# Patient Record
Sex: MALE | Race: WHITE | NOT HISPANIC OR LATINO | Employment: OTHER | URBAN - METROPOLITAN AREA
[De-identification: names, ages, dates, MRNs, and addresses within clinical notes are randomized per-mention and may not be internally consistent; named-entity substitution may affect disease eponyms.]

---

## 2021-12-26 ENCOUNTER — APPOINTMENT (OUTPATIENT)
Dept: GENERAL RADIOLOGY | Facility: HOSPITAL | Age: 61
End: 2021-12-26

## 2021-12-26 LAB
ALBUMIN SERPL-MCNC: 4.8 G/DL (ref 3.5–5.2)
ALBUMIN/GLOB SERPL: 1.7 G/DL
ALP SERPL-CCNC: 85 U/L (ref 39–117)
ALT SERPL W P-5'-P-CCNC: 23 U/L (ref 1–41)
ANION GAP SERPL CALCULATED.3IONS-SCNC: 12.2 MMOL/L (ref 5–15)
AST SERPL-CCNC: 22 U/L (ref 1–40)
BASOPHILS # BLD AUTO: 0.05 10*3/MM3 (ref 0–0.2)
BASOPHILS NFR BLD AUTO: 0.7 % (ref 0–1.5)
BILIRUB SERPL-MCNC: 0.6 MG/DL (ref 0–1.2)
BUN SERPL-MCNC: 15 MG/DL (ref 6–20)
BUN/CREAT SERPL: 11.6 (ref 7–25)
CALCIUM SPEC-SCNC: 9.8 MG/DL (ref 8.6–10.5)
CHLORIDE SERPL-SCNC: 103 MMOL/L (ref 98–107)
CO2 SERPL-SCNC: 29.8 MMOL/L (ref 22–29)
CREAT SERPL-MCNC: 1.29 MG/DL (ref 0.76–1.27)
DEPRECATED RDW RBC AUTO: 42.3 FL (ref 37–54)
EOSINOPHIL # BLD AUTO: 0.12 10*3/MM3 (ref 0–0.4)
EOSINOPHIL NFR BLD AUTO: 1.7 % (ref 0.3–6.2)
ERYTHROCYTE [DISTWIDTH] IN BLOOD BY AUTOMATED COUNT: 12.4 % (ref 12.3–15.4)
GFR SERPL CREATININE-BSD FRML MDRD: 57 ML/MIN/1.73
GLOBULIN UR ELPH-MCNC: 2.8 GM/DL
GLUCOSE SERPL-MCNC: 103 MG/DL (ref 65–99)
HCT VFR BLD AUTO: 43.6 % (ref 37.5–51)
HGB BLD-MCNC: 15.2 G/DL (ref 13–17.7)
HOLD SPECIMEN: NORMAL
HOLD SPECIMEN: NORMAL
IMM GRANULOCYTES # BLD AUTO: 0.03 10*3/MM3 (ref 0–0.05)
IMM GRANULOCYTES NFR BLD AUTO: 0.4 % (ref 0–0.5)
LYMPHOCYTES # BLD AUTO: 2.19 10*3/MM3 (ref 0.7–3.1)
LYMPHOCYTES NFR BLD AUTO: 30.9 % (ref 19.6–45.3)
MCH RBC QN AUTO: 32.8 PG (ref 26.6–33)
MCHC RBC AUTO-ENTMCNC: 34.9 G/DL (ref 31.5–35.7)
MCV RBC AUTO: 94 FL (ref 79–97)
MONOCYTES # BLD AUTO: 0.68 10*3/MM3 (ref 0.1–0.9)
MONOCYTES NFR BLD AUTO: 9.6 % (ref 5–12)
NEUTROPHILS NFR BLD AUTO: 4.01 10*3/MM3 (ref 1.7–7)
NEUTROPHILS NFR BLD AUTO: 56.7 % (ref 42.7–76)
NRBC BLD AUTO-RTO: 0 /100 WBC (ref 0–0.2)
PLATELET # BLD AUTO: 210 10*3/MM3 (ref 140–450)
PMV BLD AUTO: 10.8 FL (ref 6–12)
POTASSIUM SERPL-SCNC: 3.7 MMOL/L (ref 3.5–5.2)
PROT SERPL-MCNC: 7.6 G/DL (ref 6–8.5)
RBC # BLD AUTO: 4.64 10*6/MM3 (ref 4.14–5.8)
SODIUM SERPL-SCNC: 145 MMOL/L (ref 136–145)
TROPONIN T SERPL-MCNC: <0.01 NG/ML (ref 0–0.03)
WBC NRBC COR # BLD: 7.08 10*3/MM3 (ref 3.4–10.8)
WHOLE BLOOD HOLD SPECIMEN: NORMAL
WHOLE BLOOD HOLD SPECIMEN: NORMAL

## 2021-12-26 PROCEDURE — 93010 ELECTROCARDIOGRAM REPORT: CPT | Performed by: INTERNAL MEDICINE

## 2021-12-26 PROCEDURE — 71046 X-RAY EXAM CHEST 2 VIEWS: CPT

## 2021-12-26 PROCEDURE — 84484 ASSAY OF TROPONIN QUANT: CPT

## 2021-12-26 PROCEDURE — 99285 EMERGENCY DEPT VISIT HI MDM: CPT

## 2021-12-26 PROCEDURE — 93005 ELECTROCARDIOGRAM TRACING: CPT | Performed by: EMERGENCY MEDICINE

## 2021-12-26 PROCEDURE — 85025 COMPLETE CBC W/AUTO DIFF WBC: CPT

## 2021-12-26 PROCEDURE — 80053 COMPREHEN METABOLIC PANEL: CPT

## 2021-12-26 PROCEDURE — 93005 ELECTROCARDIOGRAM TRACING: CPT

## 2021-12-26 RX ORDER — ASPIRIN 325 MG
325 TABLET ORAL ONCE
Status: DISCONTINUED | OUTPATIENT
Start: 2021-12-26 | End: 2021-12-27 | Stop reason: HOSPADM

## 2021-12-26 RX ORDER — SODIUM CHLORIDE 0.9 % (FLUSH) 0.9 %
10 SYRINGE (ML) INJECTION AS NEEDED
Status: DISCONTINUED | OUTPATIENT
Start: 2021-12-26 | End: 2021-12-27 | Stop reason: HOSPADM

## 2021-12-26 NOTE — ED NOTES
Pt c/o cp and chest fluttering. Pt states that he is having swelling in his ankles and is feeling weak. Pt taking norco 5mg for back pain which he took about 2 hours ago.     Patient was placed in face mask during first look triage.  Patient was wearing a face mask throughout encounter.  I wore all personal protective equipment including N95 mask, goggles, and gloves throughout the encounter.  Hand hygiene was performed before and after patient encounter.        Mary Foley RN  12/26/21 5134     [] : Resident

## 2021-12-27 ENCOUNTER — HOSPITAL ENCOUNTER (OUTPATIENT)
Facility: HOSPITAL | Age: 61
Setting detail: OBSERVATION
Discharge: HOME | End: 2021-12-27
Attending: EMERGENCY MEDICINE | Admitting: EMERGENCY MEDICINE

## 2021-12-27 ENCOUNTER — APPOINTMENT (OUTPATIENT)
Dept: NUCLEAR MEDICINE | Facility: HOSPITAL | Age: 61
End: 2021-12-27

## 2021-12-27 VITALS
BODY MASS INDEX: 34.1 KG/M2 | HEIGHT: 68 IN | SYSTOLIC BLOOD PRESSURE: 146 MMHG | HEART RATE: 83 BPM | RESPIRATION RATE: 16 BRPM | OXYGEN SATURATION: 93 % | DIASTOLIC BLOOD PRESSURE: 75 MMHG | WEIGHT: 225 LBS | TEMPERATURE: 98.1 F

## 2021-12-27 DIAGNOSIS — R07.9 CHEST PAIN, UNSPECIFIED TYPE: Primary | ICD-10-CM

## 2021-12-27 LAB
ANION GAP SERPL CALCULATED.3IONS-SCNC: 10.4 MMOL/L (ref 5–15)
BH CV REST NUCLEAR ISOTOPE DOSE: 11.6 MCI
BH CV STRESS BP STAGE 1: NORMAL
BH CV STRESS BP STAGE 2: NORMAL
BH CV STRESS DURATION MIN STAGE 1: 3
BH CV STRESS DURATION MIN STAGE 2: 3
BH CV STRESS DURATION SEC STAGE 1: 0
BH CV STRESS DURATION SEC STAGE 2: 0
BH CV STRESS GRADE STAGE 1: 10
BH CV STRESS GRADE STAGE 2: 12
BH CV STRESS HR STAGE 1: 125
BH CV STRESS HR STAGE 2: 141
BH CV STRESS METS STAGE 1: 5
BH CV STRESS METS STAGE 2: 7.5
BH CV STRESS NUCLEAR ISOTOPE DOSE: 32.2 MCI
BH CV STRESS PROTOCOL 1: NORMAL
BH CV STRESS RECOVERY BP: NORMAL MMHG
BH CV STRESS RECOVERY HR: 92 BPM
BH CV STRESS SPEED STAGE 1: 1.7
BH CV STRESS SPEED STAGE 2: 2.5
BH CV STRESS STAGE 1: 1
BH CV STRESS STAGE 2: 2
BUN SERPL-MCNC: 13 MG/DL (ref 6–20)
BUN/CREAT SERPL: 11.8 (ref 7–25)
CALCIUM SPEC-SCNC: 9.1 MG/DL (ref 8.6–10.5)
CHLORIDE SERPL-SCNC: 104 MMOL/L (ref 98–107)
CO2 SERPL-SCNC: 25.6 MMOL/L (ref 22–29)
CREAT SERPL-MCNC: 1.1 MG/DL (ref 0.76–1.27)
DEPRECATED RDW RBC AUTO: 46.4 FL (ref 37–54)
ERYTHROCYTE [DISTWIDTH] IN BLOOD BY AUTOMATED COUNT: 12.8 % (ref 12.3–15.4)
GFR SERPL CREATININE-BSD FRML MDRD: 69 ML/MIN/1.73
GLUCOSE SERPL-MCNC: 117 MG/DL (ref 65–99)
HCT VFR BLD AUTO: 43 % (ref 37.5–51)
HGB BLD-MCNC: 14.3 G/DL (ref 13–17.7)
INR PPP: 1.05 (ref 0.9–1.1)
LV EF NUC BP: 62 %
MAGNESIUM SERPL-MCNC: 2.3 MG/DL (ref 1.6–2.6)
MAXIMAL PREDICTED HEART RATE: 161 BPM
MCH RBC QN AUTO: 32.9 PG (ref 26.6–33)
MCHC RBC AUTO-ENTMCNC: 33.3 G/DL (ref 31.5–35.7)
MCV RBC AUTO: 98.9 FL (ref 79–97)
PERCENT MAX PREDICTED HR: 88.82 %
PLATELET # BLD AUTO: 204 10*3/MM3 (ref 140–450)
PMV BLD AUTO: 11.1 FL (ref 6–12)
POTASSIUM SERPL-SCNC: 4.3 MMOL/L (ref 3.5–5.2)
PROTHROMBIN TIME: 13.6 SECONDS (ref 11.7–14.2)
QT INTERVAL: 402 MS
QT INTERVAL: 414 MS
RBC # BLD AUTO: 4.35 10*6/MM3 (ref 4.14–5.8)
SARS-COV-2 RNA RESP QL NAA+PROBE: NOT DETECTED
SODIUM SERPL-SCNC: 140 MMOL/L (ref 136–145)
STRESS BASELINE BP: NORMAL MMHG
STRESS BASELINE HR: 79 BPM
STRESS PERCENT HR: 104 %
STRESS POST ESTIMATED WORKLOAD: 7.1 METS
STRESS POST EXERCISE DUR MIN: 6 MIN
STRESS POST EXERCISE DUR SEC: 0 SEC
STRESS POST PEAK BP: NORMAL MMHG
STRESS POST PEAK HR: 143 BPM
STRESS TARGET HR: 137 BPM
TROPONIN T SERPL-MCNC: <0.01 NG/ML (ref 0–0.03)
TROPONIN T SERPL-MCNC: <0.01 NG/ML (ref 0–0.03)
WBC NRBC COR # BLD: 7.34 10*3/MM3 (ref 3.4–10.8)

## 2021-12-27 PROCEDURE — 93016 CV STRESS TEST SUPVJ ONLY: CPT | Performed by: INTERNAL MEDICINE

## 2021-12-27 PROCEDURE — G0378 HOSPITAL OBSERVATION PER HR: HCPCS

## 2021-12-27 PROCEDURE — 93010 ELECTROCARDIOGRAM REPORT: CPT | Performed by: INTERNAL MEDICINE

## 2021-12-27 PROCEDURE — 83735 ASSAY OF MAGNESIUM: CPT | Performed by: EMERGENCY MEDICINE

## 2021-12-27 PROCEDURE — A9500 TC99M SESTAMIBI: HCPCS | Performed by: EMERGENCY MEDICINE

## 2021-12-27 PROCEDURE — 85610 PROTHROMBIN TIME: CPT | Performed by: NURSE PRACTITIONER

## 2021-12-27 PROCEDURE — 78452 HT MUSCLE IMAGE SPECT MULT: CPT

## 2021-12-27 PROCEDURE — 99204 OFFICE O/P NEW MOD 45 MIN: CPT | Performed by: INTERNAL MEDICINE

## 2021-12-27 PROCEDURE — 78452 HT MUSCLE IMAGE SPECT MULT: CPT | Performed by: INTERNAL MEDICINE

## 2021-12-27 PROCEDURE — 93005 ELECTROCARDIOGRAM TRACING: CPT | Performed by: EMERGENCY MEDICINE

## 2021-12-27 PROCEDURE — 93017 CV STRESS TEST TRACING ONLY: CPT

## 2021-12-27 PROCEDURE — 0 TECHNETIUM SESTAMIBI: Performed by: EMERGENCY MEDICINE

## 2021-12-27 PROCEDURE — U0003 INFECTIOUS AGENT DETECTION BY NUCLEIC ACID (DNA OR RNA); SEVERE ACUTE RESPIRATORY SYNDROME CORONAVIRUS 2 (SARS-COV-2) (CORONAVIRUS DISEASE [COVID-19]), AMPLIFIED PROBE TECHNIQUE, MAKING USE OF HIGH THROUGHPUT TECHNOLOGIES AS DESCRIBED BY CMS-2020-01-R: HCPCS | Performed by: EMERGENCY MEDICINE

## 2021-12-27 PROCEDURE — 84484 ASSAY OF TROPONIN QUANT: CPT | Performed by: EMERGENCY MEDICINE

## 2021-12-27 PROCEDURE — 80048 BASIC METABOLIC PNL TOTAL CA: CPT | Performed by: EMERGENCY MEDICINE

## 2021-12-27 PROCEDURE — 93018 CV STRESS TEST I&R ONLY: CPT | Performed by: INTERNAL MEDICINE

## 2021-12-27 PROCEDURE — 85027 COMPLETE CBC AUTOMATED: CPT | Performed by: EMERGENCY MEDICINE

## 2021-12-27 RX ORDER — LIDOCAINE 50 MG/G
1-3 PATCH TOPICAL EVERY 24 HOURS
COMMUNITY
Start: 2021-02-05 | End: 2022-02-05

## 2021-12-27 RX ORDER — AMLODIPINE BESYLATE 10 MG/1
10 TABLET ORAL DAILY
Qty: 15 TABLET | Refills: 0 | Status: SHIPPED | OUTPATIENT
Start: 2021-12-27

## 2021-12-27 RX ORDER — AMLODIPINE BESYLATE 10 MG/1
10 TABLET ORAL
Status: DISCONTINUED | OUTPATIENT
Start: 2021-12-27 | End: 2021-12-27 | Stop reason: HOSPADM

## 2021-12-27 RX ORDER — SODIUM CHLORIDE 9 MG/ML
125 INJECTION, SOLUTION INTRAVENOUS CONTINUOUS
Status: DISCONTINUED | OUTPATIENT
Start: 2021-12-27 | End: 2021-12-27

## 2021-12-27 RX ORDER — HYDROCHLOROTHIAZIDE 25 MG/1
25 TABLET ORAL DAILY
Status: DISCONTINUED | OUTPATIENT
Start: 2021-12-27 | End: 2021-12-27 | Stop reason: HOSPADM

## 2021-12-27 RX ORDER — PANTOPRAZOLE SODIUM 40 MG/1
40 TABLET, DELAYED RELEASE ORAL DAILY
Qty: 30 TABLET | Refills: 0 | Status: SHIPPED | OUTPATIENT
Start: 2021-12-27

## 2021-12-27 RX ORDER — HYDROCHLOROTHIAZIDE 25 MG/1
25 TABLET ORAL DAILY
Qty: 30 TABLET | Refills: 0 | Status: SHIPPED | OUTPATIENT
Start: 2021-12-27

## 2021-12-27 RX ORDER — SODIUM CHLORIDE 0.9 % (FLUSH) 0.9 %
10 SYRINGE (ML) INJECTION EVERY 12 HOURS SCHEDULED
Status: DISCONTINUED | OUTPATIENT
Start: 2021-12-27 | End: 2021-12-27 | Stop reason: HOSPADM

## 2021-12-27 RX ORDER — SODIUM CHLORIDE 0.9 % (FLUSH) 0.9 %
10 SYRINGE (ML) INJECTION AS NEEDED
Status: DISCONTINUED | OUTPATIENT
Start: 2021-12-27 | End: 2021-12-27

## 2021-12-27 RX ORDER — HYDROCODONE BITARTRATE AND ACETAMINOPHEN 5; 325 MG/1; MG/1
1 TABLET ORAL
COMMUNITY
Start: 2021-11-15

## 2021-12-27 RX ORDER — NITROGLYCERIN 0.4 MG/1
0.4 TABLET SUBLINGUAL
Status: COMPLETED | OUTPATIENT
Start: 2021-12-27 | End: 2021-12-27

## 2021-12-27 RX ORDER — AMLODIPINE BESYLATE 5 MG/1
1 TABLET ORAL DAILY
Status: ON HOLD | COMMUNITY
Start: 2021-12-16 | End: 2021-12-27 | Stop reason: SDUPTHER

## 2021-12-27 RX ADMIN — SODIUM CHLORIDE 125 ML/HR: 9 INJECTION, SOLUTION INTRAVENOUS at 05:07

## 2021-12-27 RX ADMIN — NITROGLYCERIN 0.4 MG: 0.4 TABLET SUBLINGUAL at 02:40

## 2021-12-27 RX ADMIN — Medication 10 ML: at 02:58

## 2021-12-27 RX ADMIN — NITROGLYCERIN 0.4 MG: 0.4 TABLET SUBLINGUAL at 02:50

## 2021-12-27 RX ADMIN — TECHNETIUM TC 99M SESTAMIBI 1 DOSE: 1 INJECTION INTRAVENOUS at 09:02

## 2021-12-27 RX ADMIN — TECHNETIUM TC 99M SESTAMIBI 1 DOSE: 1 INJECTION INTRAVENOUS at 11:42

## 2021-12-27 RX ADMIN — NITROGLYCERIN 0.4 MG: 0.4 TABLET SUBLINGUAL at 02:45

## 2021-12-27 RX ADMIN — NITROGLYCERIN 1 INCH: 20 OINTMENT TOPICAL at 03:11

## 2021-12-27 NOTE — H&P
.   [facility]   HISTORY AND PHYSICAL    Patient Name: Jayant Apodaca  : 1960  MRN: [id number]  Primary Care Physician:  Amanda Miranda MD  Date of admission: 2021    Subjective   Subjective     Chief Complaint: Chest pain    HPI:    Jayant Apodaca is a 61 y.o. male with past medical history of hypertension presents to [facility] for evaluation of chest pain.  Patient reports chest pain started around 1700.  Patient describes his chest pain as diffuse tightness that did not radiate at that time.  Patient denies any associated nausea, vomiting, dyspnea, but does state that he broke out in sweat and heartburn-like sensation.  Patient states the pain resolved shortly after arriving to the ER.  States pain is worse when lying flat and better with physical activity.  Though, patient states that he had chest pain when he was chasing his granddaughter in the house.  Patient reports that he has taken 2 baby aspirin prior to arrival to the ED.  About an hour ago while at the ED patient started having right anterior chest pain that radiates through his right back.  Patient was given 1 nitroglycerin sublingual in the alleviated his pain.  Patient report he had cardiac catheterization done in  that was negative.  Also, patient states his biological father  at age 59 from extensive heart disease.  Patient  initial and repeat troponin were unremarkable and his EKG demonstrates normal sinus rhythm with no ischemic changes compared to the previous EKG on file.  Chest x-ray was negative for any acute infiltration.    Review of Systems   All systems were reviewed and negative except for: All systems are reviewed and negative except for the mentioned above in HPI    Personal History     Past Medical History:   Diagnosis Date   • Hypertension        Past Surgical History:   Procedure Laterality Date   • APPENDECTOMY     • BACK SURGERY         Family History: family history is not on  file. Otherwise pertinent FHx was reviewed and not pertinent to current issue.    Social History:  reports that he has never smoked. He has never used smokeless tobacco. He reports that he does not drink alcohol and does not use drugs.    Home Medications:  HYDROcodone-acetaminophen, amLODIPine, and lidocaine    Allergies:  No Known Allergies    Objective   Objective     Vitals:   Temp:  [97.1 °F (36.2 °C)] 97.1 °F (36.2 °C)  Heart Rate:  [66-95] 74  Resp:  [15-19] 15  BP: (140-190)/() 147/88  Physical Exam    Constitutional: Awake, alert   Eyes: PERRLA, sclerae anicteric, no conjunctival injection   HENT: NCAT, mucous membranes moist   Neck: Supple, no thyromegaly, no lymphadenopathy, trachea midline   Respiratory: Clear to auscultation bilaterally, nonlabored respirations    Cardiovascular: RRR, no murmurs, rubs, or gallops, palpable pedal pulses bilaterally   Gastrointestinal: Positive bowel sounds, soft, nontender, nondistended   Musculoskeletal: Trace edema to bilateral ankles, no clubbing or cyanosis to extremities   Psychiatric: Appropriate affect, cooperative   Neurologic: Oriented x 3, strength symmetric in all extremities, Cranial Nerves grossly intact to confrontation, speech clear   Skin: No rashes     Result Review    Result Review:  I have personally reviewed the results from the time of this admission to 12/27/2021 03:31 EST and agree with these findings:  [x]  Laboratory  []  Microbiology  [x]  Radiology  [x]  EKG/Telemetry   []  Cardiology/Vascular   []  Pathology  []  Old records  []  Other:  Most notable findings include:  Glucose 103, CO2 29.8, creatinine 1.29,    Assessment/Plan   Assessment / Plan     Brief Patient Summary:  Jayant Apodaca is a 61 y.o. male who was seen and evaluated at the ED for chest pain.  Patient is being admitted to observation and further evaluation and cardiology consult      Active Hospital Problems:  Active Hospital Problems    Diagnosis    • Chest pain       Plan:   Chest pain  -Cardiology consult  -Initial and repeat troponin unremarkable  -Chest x-ray negative for any acute infiltration  -Repeat EKG and troponin in a.m.  -N.p.o. consideration of stress test in a.m.  -NS at 125 mL/hr    Hypertension  -Chronic condition, will continue home medication after cardiology evaluation    DVT prophylaxis:  Mechanical DVT prophylaxis orders are present.    CODE STATUS:    Level Of Support Discussed With: Patient  Code Status (Patient has no pulse and is not breathing): CPR (Attempt to Resuscitate)  Medical Interventions (Patient has pulse or is breathing): Full Support    Admission Status:  I believe this patient meets observation status.    Electronically signed by GURJIT Astorga, 12/27/21, 3:31 AM EST.

## 2021-12-27 NOTE — CONSULTS
Patient Name: Jayant Apodaca  :1960  61 y.o.    Date of Admission: 2021  Date of Consultation:  21  Encounter Provider: Kathy Nathan RN  Place of Service: [facility] CARDIOLOGY  Referring Provider: Jamal Dias MD  Patient Care Team:  Amanda Mirnada MD as PCP - General (Pediatrics)      Chief complaint: Chest pain    History of Present Illness:  This is a 61 year old male with history of HTN who came into the ER last night for chest pain that began around 5 pm     He told the ER nurse practitioner that the pain started after chasing his granddaughter.  He told me that the pain started at rest.  He described as a burning pressure in the chest.  Associated with diaphoresis and shortness of breath.  No radiation to the arm or neck.  He has had posterior neck pain on palpation as well as some numbness and tingling at night.  We got the results of an MRI performed recently at an outpatient center which showed some nerve root compression.   In  he had a cardiac catheterization which was normal.  He takes amlodipine and has some mild lower extremity edema which is unchanged from his usual.    Negative troponin x 3. Creatine on admission of 1.29- which is better today at 1.1.     Chest XR on 21-  IMPRESSION:  No acute findings.    Previous Testing-  Echocardiogram on 2/3/14-  Conclusions  There is normal left ventricular systolic function.  Mild to moderate concentric left ventricular hypertrophy is observed.  Abnormal left ventricular diastolic filling is observed, consistent with  Grade 1 (impaired LV relaxation).    Cardiac catheterization on 2/3/14-  2.  Left main coronary artery: The left main coronary is a large-caliber vessel  without disease.                                                    3.  Left anterior descending coronary artery: The LAD is a large-caliber vessel  that wraps around the apex without disease. The first two diagonals are  large  in caliber without disease. The second diagonal is a small-caliber vessel  without disease.       4.  Left circumflex: The left circumflex proximally is a large-caliber vessel  without disease. The first obtuse marginal is a tiny caliber vessel without  disease. The second obtuse marginal is a small-caliber vessel without disease.  The distal circumflex gives off a small-caliber posterior descending artery  without disease. There is also a large caliber posterolateral artery without  disease.                                            5.  Right coronary artery: The RCA is a moderate caliber codominant vessel  without disease. The posterior descending artery is a moderate-caliber vessel  without disease.              6.  Left ventriculogram: Left ventriculogram in the LAINEZ projection shows normal  left ventricular chamber sizes, normal left ventricular systolic function with  an ejection fraction of 60%. No segmental wall motion abnormality. No  intracavitary filling defect is seen. No mitral regurgitation. Normal-caliber  aortic root.                           SUMMARY:   1.  Normal coronary arteries.                                 2.  Normal left ventricular systolic function.              Past Medical History:   Diagnosis Date   • Hypertension        Past Surgical History:   Procedure Laterality Date   • APPENDECTOMY     • BACK SURGERY  2003         Prior to Admission medications    Medication Sig Start Date End Date Taking? Authorizing Provider   amLODIPine (NORVASC) 5 MG tablet Take 1 tablet by mouth Daily. 12/16/21  Yes Bethanie Green MD   HYDROcodone-acetaminophen (NORCO) 5-325 MG per tablet Take 1 tablet by mouth. 11/15/21  Yes ProviderBethanie MD   lidocaine (LIDODERM) 5 % Place 1-3 patches on the skin as directed by provider Daily. 2/5/21 2/5/22 Yes Bethanie Green MD       No Known Allergies    Social History     Socioeconomic History   • Marital status:    Tobacco Use   •  Smoking status: Never Smoker   • Smokeless tobacco: Never Used   Substance and Sexual Activity   • Alcohol use: Never   • Drug use: Never       History reviewed. No pertinent family history.    REVIEW OF SYSTEMS:   All systems reviewed.  Pertinent positives identified in HPI.  All other systems are negative.      Objective:     Vitals:    12/27/21 0311 12/27/21 0431 12/27/21 0458 12/27/21 0531   BP: 147/88 123/69  113/62   Pulse: 74  61 58   Resp:       Temp:       SpO2:   96% 95%   Weight:       Height:         Body mass index is 34.21 kg/m².    General Appearance:    Alert, cooperative, in no acute distress   Head:    Normocephalic, without obvious abnormality, atraumatic   Eyes:            Lids and lashes normal, conjunctivae and sclerae normal, no icterus, no pallor, corneas clear, PERRLA   Ears:    Ears appear intact with no abnormalities noted   Throat:   No oral lesions, no thrush, oral mucosa moist   Neck:   No adenopathy, supple, trachea midline, no thyromegaly, no carotid bruit, no JVD   Back:     No kyphosis present, no scoliosis present, no skin lesions, erythema or scars, no tenderness to percussion or palpation, range of motion normal   Lungs:     Clear to auscultation, respirations regular, even and unlabored    Heart:    Regular rhythm and normal rate, normal S1 and S2, no murmur, no gallop, no rub, no click   Chest Wall:    No abnormalities observed   Abdomen:     Normal bowel sounds, no masses, no organomegaly, soft, nontender, nondistended, no guarding, no rebound  tenderness   Extremities:   Moves all extremities well, no edema, no cyanosis, no redness   Pulses:   Pulses palpable and equal bilaterally. Normal radial, carotid, femoral, dorsalis pedis and posterior tibial pulses bilaterally. Normal abdominal aorta   Skin:  Psychiatric:   No bleeding, bruising or rash    Alert and oriented x 3, normal mood and affect   Lab Review:     Results from last 7 days   Lab Units 12/27/21  050  12/26/21 2015 12/26/21 2015   SODIUM mmol/L 140   < > 145   POTASSIUM mmol/L 4.3   < > 3.7   CHLORIDE mmol/L 104   < > 103   CO2 mmol/L 25.6   < > 29.8*   BUN mg/dL 13   < > 15   CREATININE mg/dL 1.10   < > 1.29*   CALCIUM mg/dL 9.1   < > 9.8   BILIRUBIN mg/dL  --   --  0.6   ALK PHOS U/L  --   --  85   ALT (SGPT) U/L  --   --  23   AST (SGOT) U/L  --   --  22   GLUCOSE mg/dL 117*   < > 103*    < > = values in this interval not displayed.     Results from last 7 days   Lab Units 12/27/21  0506 12/27/21  0113 12/26/21 2015   TROPONIN T ng/mL <0.010 <0.010 <0.010     Results from last 7 days   Lab Units 12/27/21  0506   WBC 10*3/mm3 7.34   HEMOGLOBIN g/dL 14.3   HEMATOCRIT % 43.0   PLATELETS 10*3/mm3 204     Results from last 7 days   Lab Units 12/27/21  0506   INR  1.05     Results from last 7 days   Lab Units 12/27/21  0506   MAGNESIUM mg/dL 2.3                 EKG on 12/27/21-      EKG on 12/26/21-      I personally viewed and interpreted the patient's EKG/Telemetry data.        Assessment and Plan:     1.  Chest pain: Typical and atypical features.  We will plan a treadmill nuclear stress test.  If normal I would plan to treat him for indigestion.  2.  Neck pain and bilateral arm paresthesias: Follow-up with primary care physician, MRI report reviewed  3.  Hypertension: Uncontrolled.  Presenting blood pressure was 190/110.  Increase amlodipine to 10, add HCTZ 25.    Fabiola Stephen MD  [facility]  12/27/21

## 2021-12-27 NOTE — ED PROVIDER NOTES
EMERGENCY DEPARTMENT ENCOUNTER    Room Number:  14/14  Date of encounter:  12/27/2021  PCP: Amanda Miranda MD  Historian: Patient      HPI:  Chief Complaint: Chest pain  A complete HPI/ROS/PMH/PSH/SH/FH are unobtainable due to: None    Context: Jayant Apodaca is a 61 y.o. male who presents to the ED c/o chest pain which started approximately 5 to 6 hours ago.  Resolved upon presentation to the ED.  Describes pain as being tight and squeezing on the left side states he took his breath away.  Denies any nausea vomiting.  Does state that it made him feel sweaty.  Patient has strong family history of heart disease, history of hypertension, no diabetes normal cholesterol.  No smoking.  Patient had prior cardiac catheterization in 2014 which was normal.      PAST MEDICAL HISTORY  Active Ambulatory Problems     Diagnosis Date Noted   • No Active Ambulatory Problems     Resolved Ambulatory Problems     Diagnosis Date Noted   • No Resolved Ambulatory Problems     Past Medical History:   Diagnosis Date   • Hypertension          PAST SURGICAL HISTORY  Past Surgical History:   Procedure Laterality Date   • APPENDECTOMY     • BACK SURGERY  2003         FAMILY HISTORY  History reviewed. No pertinent family history.      SOCIAL HISTORY  Social History     Socioeconomic History   • Marital status:    Tobacco Use   • Smoking status: Never Smoker   • Smokeless tobacco: Never Used   Substance and Sexual Activity   • Alcohol use: Never   • Drug use: Never         ALLERGIES  Patient has no known allergies.        REVIEW OF SYSTEMS  Review of Systems     All systems reviewed and negative except for those discussed in HPI.       PHYSICAL EXAM    I have reviewed the triage vital signs and nursing notes.    ED Triage Vitals   Temp Heart Rate Resp BP SpO2   12/26/21 1850 12/26/21 1850 12/26/21 1850 12/26/21 2017 12/26/21 1850   97.1 °F (36.2 °C) 95 18 178/88 98 %      Temp src Heart Rate Source Patient Position BP Location FiO2  (%)   -- -- -- -- --              Physical Exam  GENERAL: not distressed  HENT: nares patent  EYES: no scleral icterus  CV: regular rhythm, regular rate  RESPIRATORY: normal effort, clear to station bilaterally  ABDOMEN: soft nontender nondistended  MUSCULOSKELETAL: no deformity  NEURO: alert, moves all extremities, follows commands  SKIN: warm, dry        LAB RESULTS  Recent Results (from the past 24 hour(s))   ECG 12 Lead    Collection Time: 12/26/21  7:05 PM   Result Value Ref Range    QT Interval 402 ms   Comprehensive Metabolic Panel    Collection Time: 12/26/21  8:15 PM    Specimen: Blood   Result Value Ref Range    Glucose 103 (H) 65 - 99 mg/dL    BUN 15 6 - 20 mg/dL    Creatinine 1.29 (H) 0.76 - 1.27 mg/dL    Sodium 145 136 - 145 mmol/L    Potassium 3.7 3.5 - 5.2 mmol/L    Chloride 103 98 - 107 mmol/L    CO2 29.8 (H) 22.0 - 29.0 mmol/L    Calcium 9.8 8.6 - 10.5 mg/dL    Total Protein 7.6 6.0 - 8.5 g/dL    Albumin 4.80 3.50 - 5.20 g/dL    ALT (SGPT) 23 1 - 41 U/L    AST (SGOT) 22 1 - 40 U/L    Alkaline Phosphatase 85 39 - 117 U/L    Total Bilirubin 0.6 0.0 - 1.2 mg/dL    eGFR Non African Amer 57 (L) >60 mL/min/1.73    Globulin 2.8 gm/dL    A/G Ratio 1.7 g/dL    BUN/Creatinine Ratio 11.6 7.0 - 25.0    Anion Gap 12.2 5.0 - 15.0 mmol/L   Troponin    Collection Time: 12/26/21  8:15 PM    Specimen: Blood   Result Value Ref Range    Troponin T <0.010 0.000 - 0.030 ng/mL   Green Top (Gel)    Collection Time: 12/26/21  8:15 PM   Result Value Ref Range    Extra Tube Hold for add-ons.    Lavender Top    Collection Time: 12/26/21  8:15 PM   Result Value Ref Range    Extra Tube hold for add-on    Gold Top - SST    Collection Time: 12/26/21  8:15 PM   Result Value Ref Range    Extra Tube Hold for add-ons.    Light Blue Top    Collection Time: 12/26/21  8:15 PM   Result Value Ref Range    Extra Tube hold for add-on    CBC Auto Differential    Collection Time: 12/26/21  8:15 PM    Specimen: Blood   Result Value Ref Range     WBC 7.08 3.40 - 10.80 10*3/mm3    RBC 4.64 4.14 - 5.80 10*6/mm3    Hemoglobin 15.2 13.0 - 17.7 g/dL    Hematocrit 43.6 37.5 - 51.0 %    MCV 94.0 79.0 - 97.0 fL    MCH 32.8 26.6 - 33.0 pg    MCHC 34.9 31.5 - 35.7 g/dL    RDW 12.4 12.3 - 15.4 %    RDW-SD 42.3 37.0 - 54.0 fl    MPV 10.8 6.0 - 12.0 fL    Platelets 210 140 - 450 10*3/mm3    Neutrophil % 56.7 42.7 - 76.0 %    Lymphocyte % 30.9 19.6 - 45.3 %    Monocyte % 9.6 5.0 - 12.0 %    Eosinophil % 1.7 0.3 - 6.2 %    Basophil % 0.7 0.0 - 1.5 %    Immature Grans % 0.4 0.0 - 0.5 %    Neutrophils, Absolute 4.01 1.70 - 7.00 10*3/mm3    Lymphocytes, Absolute 2.19 0.70 - 3.10 10*3/mm3    Monocytes, Absolute 0.68 0.10 - 0.90 10*3/mm3    Eosinophils, Absolute 0.12 0.00 - 0.40 10*3/mm3    Basophils, Absolute 0.05 0.00 - 0.20 10*3/mm3    Immature Grans, Absolute 0.03 0.00 - 0.05 10*3/mm3    nRBC 0.0 0.0 - 0.2 /100 WBC   Troponin    Collection Time: 12/27/21  1:13 AM    Specimen: Blood   Result Value Ref Range    Troponin T <0.010 0.000 - 0.030 ng/mL       Ordered the above labs and independently reviewed the results.        RADIOLOGY  XR Chest 2 View    Result Date: 12/26/2021  PA AND LATERAL CHEST RADIOGRAPH  HISTORY: Chest pain  COMPARISON: 06/23/2015  FINDINGS: Heart size is within normal limits. No pneumothorax, pleural effusion, or acute infiltrate is seen.      No acute findings.  This report was finalized on 12/26/2021 7:33 PM by Dr. Madhavi Blum M.D.        I ordered the above noted radiological studies. Reviewed by me and discussed with radiologist.  See dictation for official radiology interpretation.      PROCEDURES    Procedures      MEDICATIONS GIVEN IN ER    Medications   sodium chloride 0.9 % flush 10 mL (has no administration in time range)   aspirin tablet 325 mg (325 mg Oral Not Given 12/27/21 0141)   sodium chloride 0.9 % flush 10 mL (10 mL Intravenous Given 12/27/21 0258)   sodium chloride 0.9 % infusion (has no administration in time range)    nitroglycerin (NITROSTAT) SL tablet 0.4 mg (0.4 mg Sublingual Given 12/27/21 0250)   nitroglycerin (NITROSTAT) ointment 1 inch (1 inch Topical Given 12/27/21 0311)         PROGRESS, DATA ANALYSIS, CONSULTS, AND MEDICAL DECISION MAKING    All labs have been independently reviewed by me.  All radiology studies have been reviewed by me and discussed with radiologist dictating the report.   EKG's independently viewed and interpreted by me.  Discussion below represents my analysis of pertinent findings related to patient's condition, differential diagnosis, treatment plan and final disposition.        ED Course as of 12/27/21 0408   Mon Dec 27, 2021   0041 EKG          EKG time: 1905  Rhythm/Rate: Sinus rhythm rate 78  P waves and CT: Normal  QRS, axis: Narrow regular  ST and T waves: Normal    Interpreted Contemporaneously by me, independently viewed [TJ]   0251 Called to bedside patient with left-sided chest pain pain resolved with nitroglycerin. EKG normal. [TJ]   0251 Discussed with Dr. Gimenez. Will admit patient to the abs unit for cardiac rule out. [TJ]   0251 HEART SCORE:    History #2  (Highly suspicious 2, Moderately suspicious 1, Slightly or non-suspicious 0)    ECG #0  (Significant ST depression 2,  Nonspecific repol disturbance 1, Normal 0)    Age #1  (> or = 65 2, 46-65 1,  < or = 45 0)    Risk factors #1  (hypercholesterolemia, HTN, DM, smoking, pos fam hx, obesity)  (> or = to 3 RF 2, 1 or 2 1, No risk factors 0)    Troponin #0  (> or = 3x normal limit 2, 1-3x normal limit 1, < or = Normal limit 0)    HEART Score Key:  Scores 0-3: 0.9-1.7% risk of adverse cardiac event. In the HEART Score study, these patients were discharged (0.99% in the retrospective study, 1.7% in the prospective study)  Scores 4-6: 12-16.6% risk of adverse cardiac event. In the HEART Score study, these patients were admitted to the hospital. (11.6% retrospective, 16.6% prospective)  Scores =7: 50-65% risk of adverse cardiac event. In  the HEART Score study, these patients were candidates for early invasive measures. (65.2% retrospective, 50.1% prospective)      This patient's HEART score is 4     [TJ]   0255 Discussed with midlevel on-call for observation unit. Will accept the patient. [TJ]      ED Course User Index  [TJ] Tripp Martinez MD           PPE: The patient wore a surgical mask throughout the entire patient encounter. I wore an N95.    AS OF 04:08 EST VITALS:    BP - 147/88  HR - 74  TEMP - 97.1 °F (36.2 °C)  O2 SATS - 93%        DIAGNOSIS  Final diagnoses:   Chest pain, unspecified type         DISPOSITION  Admit to observation           Tripp Martinez MD  12/27/21 1499

## 2021-12-27 NOTE — PROGRESS NOTES
Pt presents to the ED c/o  chest tightness that started yesterday.  Patient has had intermittent episodes for several weeks, previously has been related to issues with his nerves in his neck, he states.  Denies any current symptoms at this time.  Denies any shortness of breath with this episode, but did have some clamminess and hypertension.  Cardiology to evaluate this morning.     On exam,   General: No acute distress, nontoxic, nondiaphoretic  HEENT: EOMI  Pulm: Symmetric chest rise, nonlabored breathing  CV: Regular rate and rhythm  GI: Nondistended  MSK: No deformity, unable to reproduce chest pain with palpation to the chest wall  Skin: Warm, dry  Neuro: Awake, alert, oriented x 4, moving all extremities, no focal deficits  Psych: Calm, cooperative    Vital signs and nursing notes reviewed.         N95, protective eye goggles, and gloves used during this encounter. Patient in surgical mask.      Plan: Cardiology has consulted, they plan to do a stress test on the patient this morning.  If reassuring and no other concerning developments 1 observation unit, plan for discharge with continued outpatient management of cervical issues as well as PCP and cardiology follow-up as needed.       Attestation:  The DARIELA and I have discussed this patient's history, physical exam, and treatment plan.  I have reviewed the documentation and personally had a face to face interaction with the patient. I affirm the documentation and agree with the treatment and plan.  The attached note describes my personal findings.

## 2021-12-27 NOTE — PROGRESS NOTES
ED OBSERVATION PROGRESS/DISCHARGE SUMMARY    Date of Admission: 12/27/2021   LOS: 0 days   PCP: Amanda Miranda MD    Final Diagnosis atypical chest pain      Subjective   Patient is a very pleasant afebrile ambulatory 61-year-old  male being evaluated the observation unit for chest discomfort.  He went to stress testing this morning which was negative and cardiology recommends discharge home with PCP follow-up in the next 1 to 2 weeks with some changes in his home meds for blood pressure control.  I discussed the plan with patient and his spouse and they are agreeable    Hospital Outcome: Improved    ROS:  General: no fevers, chills  Respiratory: no cough, dyspnea  Cardiovascular: no chest pain, palpitations  Abdomen: No abdominal pain, nausea, vomiting, or diarrhea  Neurologic: No focal weakness    Objective   Physical Exam:  I have reviewed the vital signs.  Temp:  [97.1 °F (36.2 °C)-98.1 °F (36.7 °C)] 98.1 °F (36.7 °C)  Heart Rate:  [58-95] 83  Resp:  [15-19] 16  BP: (113-190)/() 146/75  General Appearance:    Alert, cooperative, no distress  Head:    Normocephalic, atraumatic  Eyes:    Sclerae anicteric  Neck:   Supple, no mass  Lungs: Clear to auscultation bilaterally, respirations unlabored  Heart: Regular rate and rhythm, S1 and S2 normal, no murmur, rub or gallop  Abdomen:  Soft, non-tender, bowel sounds active, nondistended  Extremities: No clubbing, cyanosis, or edema to lower extremities  Pulses:  2+ and symmetric in distal lower extremities  Skin: No rashes   Neurologic: Oriented x3, Normal strength to extremities    Results Review:    I have reviewed the labs, radiology results and diagnostic studies.    Results from last 7 days   Lab Units 12/27/21  0506   WBC 10*3/mm3 7.34   HEMOGLOBIN g/dL 14.3   HEMATOCRIT % 43.0   PLATELETS 10*3/mm3 204     Results from last 7 days   Lab Units 12/27/21  0506 12/26/21 2015   SODIUM mmol/L 140 145   POTASSIUM mmol/L 4.3 3.7   CHLORIDE mmol/L 104  103   CO2 mmol/L 25.6 29.8*   BUN mg/dL 13 15   CREATININE mg/dL 1.10 1.29*   CALCIUM mg/dL 9.1 9.8   BILIRUBIN mg/dL  --  0.6   ALK PHOS U/L  --  85   ALT (SGPT) U/L  --  23   AST (SGOT) U/L  --  22   GLUCOSE mg/dL 117* 103*     Imaging Results (Last 24 Hours)     Procedure Component Value Units Date/Time    XR Chest 2 View [[id number]] Collected: 12/26/21 1932     Updated: 12/26/21 1936    Narrative:      PA AND LATERAL CHEST RADIOGRAPH     HISTORY: Chest pain     COMPARISON: 06/23/2015     FINDINGS:  Heart size is within normal limits. No pneumothorax, pleural effusion,  or acute infiltrate is seen.       Impression:      No acute findings.     This report was finalized on 12/26/2021 7:33 PM by Dr. Madhavi Blum M.D.             I have reviewed the medications.  ---------------------------------------------------------------------------------------------  Assessment/Plan   Assessment/Problem List    Chest pain      Plan:   Chest pain  -Seen by dr. Stephen today, stress test negative, recommend dc home with indigestion treatment, increasing amlodipine to 10mg and hctz 25mg daily and f/u with pcp in the next 1-2 weeks  - troponin unremarkable  -Chest x-ray negative for any acute infiltration  -N.p.o. consideration of stress test in a.m.  -NS at 125 mL/hr     Hypertension  -Chronic condition, will continue home medication after cardiology evaluation     DVT prophylaxis:  Mechanical DVT prophylaxis orders are present.    Disposition: home    Follow-up after Discharge: PCP in 1-2 weeks    This note will serve as a discharge summary.     Mary Carmen Wallace, APRN 12/27/21 13:16 EST          I have worn appropriate PPE during this patient encounter, sanitized my hands both with entering and exiting patient's room.

## 2021-12-27 NOTE — PLAN OF CARE
Goal Outcome Evaluation: stress test with no ischemia.  Patient stable for DC.  MD increasing norvasc and adding HCTZ.  Meds were sent to meds to bed working on that now.  DC home with wife

## 2022-01-01 LAB — QT INTERVAL: 401 MS
